# Patient Record
Sex: FEMALE | Race: BLACK OR AFRICAN AMERICAN | URBAN - METROPOLITAN AREA
[De-identification: names, ages, dates, MRNs, and addresses within clinical notes are randomized per-mention and may not be internally consistent; named-entity substitution may affect disease eponyms.]

---

## 2018-06-19 ENCOUNTER — OUTPATIENT (OUTPATIENT)
Dept: OUTPATIENT SERVICES | Facility: HOSPITAL | Age: 43
LOS: 1 days | End: 2018-06-19
Payer: COMMERCIAL

## 2018-06-19 DIAGNOSIS — Z00.8 ENCOUNTER FOR OTHER GENERAL EXAMINATION: ICD-10-CM

## 2018-06-19 PROCEDURE — 73030 X-RAY EXAM OF SHOULDER: CPT | Mod: 26,RT

## 2018-06-19 PROCEDURE — 73030 X-RAY EXAM OF SHOULDER: CPT

## 2025-03-14 ENCOUNTER — EMERGENCY (EMERGENCY)
Facility: HOSPITAL | Age: 50
LOS: 1 days | Discharge: HOME | End: 2025-03-14
Attending: EMERGENCY MEDICINE | Admitting: EMERGENCY MEDICINE
Payer: COMMERCIAL

## 2025-03-14 VITALS
TEMPERATURE: 98 F | SYSTOLIC BLOOD PRESSURE: 131 MMHG | HEART RATE: 79 BPM | OXYGEN SATURATION: 100 % | DIASTOLIC BLOOD PRESSURE: 84 MMHG | RESPIRATION RATE: 16 BRPM

## 2025-03-14 VITALS
DIASTOLIC BLOOD PRESSURE: 76 MMHG | RESPIRATION RATE: 16 BRPM | SYSTOLIC BLOOD PRESSURE: 122 MMHG | OXYGEN SATURATION: 100 % | WEIGHT: 149.91 LBS | HEART RATE: 80 BPM | HEIGHT: 65 IN | TEMPERATURE: 98 F

## 2025-03-14 LAB
ALBUMIN SERPL ELPH-MCNC: 4.3 G/DL — SIGNIFICANT CHANGE UP (ref 3.3–5)
ALP SERPL-CCNC: 54 U/L — SIGNIFICANT CHANGE UP (ref 40–120)
ALT FLD-CCNC: 16 U/L — SIGNIFICANT CHANGE UP (ref 4–33)
ANION GAP SERPL CALC-SCNC: 14 MMOL/L — SIGNIFICANT CHANGE UP (ref 7–14)
AST SERPL-CCNC: 21 U/L — SIGNIFICANT CHANGE UP (ref 4–32)
BASOPHILS # BLD AUTO: 0.03 K/UL — SIGNIFICANT CHANGE UP (ref 0–0.2)
BASOPHILS NFR BLD AUTO: 0.4 % — SIGNIFICANT CHANGE UP (ref 0–2)
BILIRUB SERPL-MCNC: 0.5 MG/DL — SIGNIFICANT CHANGE UP (ref 0.2–1.2)
BUN SERPL-MCNC: 14 MG/DL — SIGNIFICANT CHANGE UP (ref 7–23)
CALCIUM SERPL-MCNC: 9.5 MG/DL — SIGNIFICANT CHANGE UP (ref 8.4–10.5)
CHLORIDE SERPL-SCNC: 106 MMOL/L — SIGNIFICANT CHANGE UP (ref 98–107)
CO2 SERPL-SCNC: 22 MMOL/L — SIGNIFICANT CHANGE UP (ref 22–31)
CREAT SERPL-MCNC: 1 MG/DL — SIGNIFICANT CHANGE UP (ref 0.5–1.3)
EGFR: 70 ML/MIN/1.73M2 — SIGNIFICANT CHANGE UP
EGFR: 70 ML/MIN/1.73M2 — SIGNIFICANT CHANGE UP
EOSINOPHIL # BLD AUTO: 0.36 K/UL — SIGNIFICANT CHANGE UP (ref 0–0.5)
EOSINOPHIL NFR BLD AUTO: 4.7 % — SIGNIFICANT CHANGE UP (ref 0–6)
GLUCOSE SERPL-MCNC: 132 MG/DL — HIGH (ref 70–99)
HCT VFR BLD CALC: 38.1 % — SIGNIFICANT CHANGE UP (ref 34.5–45)
HGB BLD-MCNC: 12.7 G/DL — SIGNIFICANT CHANGE UP (ref 11.5–15.5)
HIV 1+2 AB+HIV1 P24 AG SERPL QL IA: SIGNIFICANT CHANGE UP
IANC: 4.03 K/UL — SIGNIFICANT CHANGE UP (ref 1.8–7.4)
IMM GRANULOCYTES NFR BLD AUTO: 0.3 % — SIGNIFICANT CHANGE UP (ref 0–0.9)
LYMPHOCYTES # BLD AUTO: 2.66 K/UL — SIGNIFICANT CHANGE UP (ref 1–3.3)
LYMPHOCYTES # BLD AUTO: 35 % — SIGNIFICANT CHANGE UP (ref 13–44)
MCHC RBC-ENTMCNC: 31.1 PG — SIGNIFICANT CHANGE UP (ref 27–34)
MCHC RBC-ENTMCNC: 33.3 G/DL — SIGNIFICANT CHANGE UP (ref 32–36)
MCV RBC AUTO: 93.4 FL — SIGNIFICANT CHANGE UP (ref 80–100)
MONOCYTES # BLD AUTO: 0.49 K/UL — SIGNIFICANT CHANGE UP (ref 0–0.9)
MONOCYTES NFR BLD AUTO: 6.5 % — SIGNIFICANT CHANGE UP (ref 2–14)
NEUTROPHILS # BLD AUTO: 4.03 K/UL — SIGNIFICANT CHANGE UP (ref 1.8–7.4)
NEUTROPHILS NFR BLD AUTO: 53.1 % — SIGNIFICANT CHANGE UP (ref 43–77)
NRBC # BLD AUTO: 0 K/UL — SIGNIFICANT CHANGE UP (ref 0–0)
NRBC # FLD: 0 K/UL — SIGNIFICANT CHANGE UP (ref 0–0)
NRBC BLD AUTO-RTO: 0 /100 WBCS — SIGNIFICANT CHANGE UP (ref 0–0)
PLATELET # BLD AUTO: 220 K/UL — SIGNIFICANT CHANGE UP (ref 150–400)
POTASSIUM SERPL-MCNC: 4 MMOL/L — SIGNIFICANT CHANGE UP (ref 3.5–5.3)
POTASSIUM SERPL-SCNC: 4 MMOL/L — SIGNIFICANT CHANGE UP (ref 3.5–5.3)
PROT SERPL-MCNC: 7.3 G/DL — SIGNIFICANT CHANGE UP (ref 6–8.3)
RBC # BLD: 4.08 M/UL — SIGNIFICANT CHANGE UP (ref 3.8–5.2)
RBC # FLD: 12.4 % — SIGNIFICANT CHANGE UP (ref 10.3–14.5)
SODIUM SERPL-SCNC: 142 MMOL/L — SIGNIFICANT CHANGE UP (ref 135–145)
WBC # BLD: 7.59 K/UL — SIGNIFICANT CHANGE UP (ref 3.8–10.5)
WBC # FLD AUTO: 7.59 K/UL — SIGNIFICANT CHANGE UP (ref 3.8–10.5)

## 2025-03-14 PROCEDURE — 99285 EMERGENCY DEPT VISIT HI MDM: CPT

## 2025-03-14 RX ORDER — DOXYCYCLINE HYCLATE 100 MG
1 TABLET ORAL
Qty: 20 | Refills: 0
Start: 2025-03-14 | End: 2025-03-23

## 2025-03-14 RX ORDER — DOXYCYCLINE HYCLATE 100 MG
100 TABLET ORAL ONCE
Refills: 0 | Status: COMPLETED | OUTPATIENT
Start: 2025-03-14 | End: 2025-03-14

## 2025-03-14 RX ADMIN — Medication 10 MILLIGRAM(S): at 19:57

## 2025-03-14 RX ADMIN — Medication 100 MILLIGRAM(S): at 21:14

## 2025-03-14 NOTE — ED PROVIDER NOTE - DISCUSSED CASE WITH MULTISELECT OPTIONS
How Severe Is It?: moderate Is This A New Presentation, Or A Follow-Up?: Follow Up Excoriations Consultant

## 2025-03-14 NOTE — ED PROVIDER NOTE - PATIENT PORTAL LINK FT
You can access the FollowMyHealth Patient Portal offered by [facility] by registering at the following website: [email or web address]. By joining [employer]’s FollowMyHealth portal, you will also be able to view your health information using other applications (apps) compatible with our system.

## 2025-03-14 NOTE — ED PROVIDER NOTE - OBJECTIVE STATEMENT
49-year-old female denies past medical history presenting to ED complaining of 2 days of lower extremity rash.  Patient  was cleaning her basement and believes she may have been bit by an insect.  Patient does not order any moment where she got bit.  Patient states has been outside did not have any wooded areas. Returned from [place] 5 days ago, state started using new dove soap around the same time, lesions on legs appeared yesterday. Patient states have been washing with hydrogen peroxide and saline. States lesions are pruritic, not painful. Denies any chest pain, palpitations, shortness of breath, cough, abdominal pain, nausea/vomiting/diarrhea.

## 2025-03-14 NOTE — ED PROVIDER NOTE - ATTENDING CONTRIBUTION TO CARE
49F p/w small lesions to both legs, starting 2 days ago.  3 Macular erythema macules to both legs to shins with vesicles measuring about 3cm across.  Itchy but not painful, no F/c.  No travel.  Has been outside, no wooded areas.  No fever.  No palms/soles involvement, no MM involvement.  Pt thinks she was bit by insect.  Derm contacted and picture shared by RADHIKA Wilson - possible arthropod reaction - they recc HSV swab and culture, PO abx.  If HSV swab positive they will rx antiviral.  Derm follow up in clinic.    VS:  unremarkable    GEN - NAD;   well appearing;   A+O x3   HEAD - NC/AT     ENT - PEERL, EOMI, mucous membranes    moist , no discharge      NECK: Neck supple, non-tender without lymphadenopathy, no masses, no JVD  PULM - CTA b/l,  symmetric breath sounds  COR -  normal heart sounds    ABD - , ND, NT, soft,  BACK - no CVA tenderness, nontender spine     EXTREMS - no edema, no deformity, warm and well perfused    SKIN - no rash    or bruising    except  3 Macular-papular erythematous round lesions to both legs to shins with vesicles measuring about 3cm across - slight clear drainage from largest vesicle.  No warmth or tenderness.  Slight induration.  No purulence or crepitus.    NEUROLOGIC - alert, face symmetric, speech fluent, sensation nl, motor no focal deficit.

## 2025-03-14 NOTE — ED PROVIDER NOTE - NSFOLLOWUPINSTRUCTIONS_ED_ALL_ED_FT
- You were seen in the Emergency Department Today for rash  - Please take the Doxycycline 100mg twice a day for the next ten days  - Please follow up with the dermatologist as discussed: [phone]  - Please follow up with your primary care doctor as discussed  - Return to the Emergency Department IMMEDIATELY if you experience Any new or concerning symptoms including worsening rash, swelling, redness, purulent discharge, fevers, lightheadedness or dizziness, you pass out

## 2025-03-14 NOTE — ED ADULT NURSE NOTE - OBJECTIVE STATEMENT
This is a 50 y/o female alert and oriented x 4, no pertinent medical history. Presented today with multiple ? bites on her lower extremities, non tender. Erythema around the lesion noted, draining white discharge. Denies fever, n/v. Appears well. Bed in lowest position, side rails up for safety.

## 2025-03-14 NOTE — ED PROVIDER NOTE - CLINICAL SUMMARY MEDICAL DECISION MAKING FREE TEXT BOX
- 49-year-old female denies past medical history presenting to ED complaining of 2 days of lower extremity rash.  Patient states was cleaning her basement and believes she may have been bit by an insect.  Patient does not order any moment where she got bit.  Patient states has been outside did not have any wooded areas. Returned from [place] 5 days ago, state started using new dove soap around the same time, lesions on legs appeared yesterday. Patient states have been washing with hydrogen peroxide and saline. States lesions are pruritic, not painful. Denies any chest pain, palpitations, shortness of breath, cough, abdominal pain, nausea/vomiting/diarrhea.  - - 49-year-old female denies past medical history presenting to ED complaining of 2 days of lower extremity rash.  Patient states was cleaning her basement and believes she may have been bit by an insect.  Patient does not order any moment where she got bit.  Patient states has been outside did not have any wooded areas. Returned from [place] 5 days ago, state started using new dove soap around the same time, lesions on legs appeared yesterday. Patient states have been washing with hydrogen peroxide and saline. States lesions are pruritic, not painful. Denies any chest pain, palpitations, shortness of breath, cough, abdominal pain, nausea/vomiting/diarrhea.  - On exam patient has area of erythema/induration with central vesicle on right anterior lower leg, left medial calf with ruptured vesicle, left anterior lower leg with no vesicle.  Patient states is not painful but pruritic.  No fevers or chills, patient afebrile well-appearing stable vital signs.  Differential includes insect bite, viral exanthem, contact dermatitis.  Will order patient doing - 49-year-old female denies past medical history presenting to ED complaining of 2 days of lower extremity rash.  Patient states was cleaning her basement and believes she may have been bit by an insect.  Patient does not order any moment where she got bit.  Patient states has been outside did not have any wooded areas. Returned from [place] 5 days ago, state started using new dove soap around the same time, lesions on legs appeared yesterday. Patient states have been washing with hydrogen peroxide and saline. States lesions are pruritic, not painful. Denies any chest pain, palpitations, shortness of breath, cough, abdominal pain, nausea/vomiting/diarrhea.  - On exam patient has area of erythema/induration with central vesicle on right anterior lower leg, left medial calf with ruptured vesicle, left anterior lower leg with no vesicle.  Patient states is not painful but pruritic.  No fevers or chills, patient afebrile well-appearing stable vital signs.  Differential includes insect bite, viral exanthem, contact dermatitis.  Will consult derm.

## 2025-03-14 NOTE — ED ADULT NURSE REASSESSMENT NOTE - NS ED NURSE REASSESS COMMENT FT1
Pt reports improvement in condition. Respirations even and unlabored. No signs of distress noted. VSS. Pt stable for discharge.

## 2025-03-14 NOTE — ED ADULT NURSE REASSESSMENT NOTE - NS ED NURSE REASSESS COMMENT FT1
Respirations equal and adequate. Patients IV patent, no signs of infiltration. Safety measures in place, call bell within reach. Patient stable upon assessment. Respirations equal and adequate. Safety measures in place, call bell within reach. Patient stable upon assessment.

## 2025-03-14 NOTE — ED PROVIDER NOTE - PHYSICAL EXAMINATION
CONSTITUTIONAL: Appears well, in no apparent distress  HEAD: Normocephalic, no obvious signs of trauma  EENT: PERRL, EOMI w/o pain, no nystagmus, nares patent no drainage, no pharyngeal erythema, swelling, or exudates  NECK: Trachea midline, no goiter  RESP: L/S equal clr, bilat, apices and bases, no accessory muscle use, speaking full sentences  CARDIC: RRR, +S1/S2, no peripheral edema  GI: ABD soft, nondistended, nontender on palpation, no palpable masses  : No CVA Tenderness  MSK: 5/5 strength extremities x 4, full ROM without pain, no midline spinal tenderness on palpation  SKIN: No rashes, normal color/condition   NEURO: A&OX4, CN intact, No focal motor deficits/weakness, no sensory deficits, no dysmetria, no slurred speech, no facial droop, normal gait CONSTITUTIONAL: Appears well, in no apparent distress  HEAD: Normocephalic, no obvious signs of trauma  EENT: PERRL, nares patent no drainage, no pharyngeal erythema, swelling, or exudates  NECK: Trachea midline, no goiter  RESP: L/S equal clr, bilat, apices and bases, no accessory muscle use, speaking full sentences  CARDIC: RRR, +S1/S2, no peripheral edema  GI: ABD soft, nondistended, nontender on palpation, no palpable masses  MSK: moaves all extremities  SKIN: area of erythema/induration with central vesicle on right anterior lower leg, left medial calf with ruptured vesicle, left anterior lower leg no vesicle  NEURO: A&OX4, No focal motor deficits/weakness, no slurred speech, no facial droop, normal gait

## 2025-03-15 LAB
GRAM STN FLD: SIGNIFICANT CHANGE UP
HSV+VZV DNA SPEC QL NAA+PROBE: SIGNIFICANT CHANGE UP
SPECIMEN SOURCE: SIGNIFICANT CHANGE UP
SPECIMEN SOURCE: SIGNIFICANT CHANGE UP

## 2025-03-17 PROBLEM — Z00.00 ENCOUNTER FOR PREVENTIVE HEALTH EXAMINATION: Status: ACTIVE | Noted: 2025-03-17

## 2025-03-18 ENCOUNTER — NON-APPOINTMENT (OUTPATIENT)
Age: 50
End: 2025-03-18

## 2025-03-18 ENCOUNTER — LABORATORY RESULT (OUTPATIENT)
Age: 50
End: 2025-03-18

## 2025-03-18 ENCOUNTER — APPOINTMENT (OUTPATIENT)
Dept: DERMATOLOGY | Facility: CLINIC | Age: 50
End: 2025-03-18
Payer: COMMERCIAL

## 2025-03-18 VITALS — WEIGHT: 155 LBS | HEIGHT: 64 IN | BODY MASS INDEX: 26.46 KG/M2

## 2025-03-18 DIAGNOSIS — R21 RASH AND OTHER NONSPECIFIC SKIN ERUPTION: ICD-10-CM

## 2025-03-18 PROCEDURE — 99204 OFFICE O/P NEW MOD 45 MIN: CPT

## 2025-03-18 RX ORDER — MOMETASONE FUROATE 1 MG/G
0.1 OINTMENT TOPICAL
Qty: 1 | Refills: 2 | Status: ACTIVE | COMMUNITY
Start: 2025-03-18 | End: 1900-01-01

## 2025-03-20 LAB
CULTURE RESULTS: SIGNIFICANT CHANGE UP
SPECIMEN SOURCE: SIGNIFICANT CHANGE UP

## 2025-04-01 ENCOUNTER — APPOINTMENT (OUTPATIENT)
Dept: DERMATOLOGY | Facility: CLINIC | Age: 50
End: 2025-04-01